# Patient Record
Sex: MALE | Race: BLACK OR AFRICAN AMERICAN | Employment: FULL TIME | URBAN - METROPOLITAN AREA
[De-identification: names, ages, dates, MRNs, and addresses within clinical notes are randomized per-mention and may not be internally consistent; named-entity substitution may affect disease eponyms.]

---

## 2023-01-03 ENCOUNTER — APPOINTMENT (OUTPATIENT)
Dept: MRI IMAGING | Age: 58
End: 2023-01-03
Attending: STUDENT IN AN ORGANIZED HEALTH CARE EDUCATION/TRAINING PROGRAM
Payer: COMMERCIAL

## 2023-01-03 ENCOUNTER — HOSPITAL ENCOUNTER (EMERGENCY)
Age: 58
Discharge: HOME | End: 2023-01-03
Attending: STUDENT IN AN ORGANIZED HEALTH CARE EDUCATION/TRAINING PROGRAM
Payer: COMMERCIAL

## 2023-01-03 VITALS
SYSTOLIC BLOOD PRESSURE: 135 MMHG | RESPIRATION RATE: 18 BRPM | TEMPERATURE: 97.5 F | DIASTOLIC BLOOD PRESSURE: 78 MMHG | HEART RATE: 66 BPM | OXYGEN SATURATION: 99 %

## 2023-01-03 DIAGNOSIS — R93.0 ABNORMAL HEAD CT: ICD-10-CM

## 2023-01-03 DIAGNOSIS — R42 DIZZINESS: Primary | ICD-10-CM

## 2023-01-03 LAB
ALBUMIN SERPL-MCNC: 3.9 G/DL (ref 3.5–5)
ALBUMIN/GLOB SERPL: 1.2 {RATIO} (ref 1.1–2.2)
ALP SERPL-CCNC: 73 U/L (ref 45–117)
ALT SERPL-CCNC: 20 U/L (ref 12–78)
ANION GAP SERPL CALC-SCNC: 1 MMOL/L (ref 5–15)
AST SERPL-CCNC: 15 U/L (ref 15–37)
ATRIAL RATE: 57 BPM
BASOPHILS # BLD: 0 K/UL (ref 0–0.1)
BASOPHILS NFR BLD: 0 % (ref 0–1)
BILIRUB SERPL-MCNC: 0.5 MG/DL (ref 0.2–1)
BUN SERPL-MCNC: 12 MG/DL (ref 6–20)
BUN/CREAT SERPL: 14 (ref 12–20)
CALCIUM SERPL-MCNC: 9.3 MG/DL (ref 8.5–10.1)
CALCULATED P AXIS, ECG09: 49 DEGREES
CALCULATED R AXIS, ECG10: 81 DEGREES
CALCULATED T AXIS, ECG11: 18 DEGREES
CHLORIDE SERPL-SCNC: 105 MMOL/L (ref 97–108)
CO2 SERPL-SCNC: 30 MMOL/L (ref 21–32)
COMMENT, HOLDF: NORMAL
CREAT SERPL-MCNC: 0.85 MG/DL (ref 0.7–1.3)
DIAGNOSIS, 93000: NORMAL
DIFFERENTIAL METHOD BLD: NORMAL
EOSINOPHIL # BLD: 0 K/UL (ref 0–0.4)
EOSINOPHIL NFR BLD: 1 % (ref 0–7)
ERYTHROCYTE [DISTWIDTH] IN BLOOD BY AUTOMATED COUNT: 12.6 % (ref 11.5–14.5)
GLOBULIN SER CALC-MCNC: 3.3 G/DL (ref 2–4)
GLUCOSE SERPL-MCNC: 103 MG/DL (ref 65–100)
HCT VFR BLD AUTO: 46.5 % (ref 36.6–50.3)
HGB BLD-MCNC: 15.4 G/DL (ref 12.1–17)
IMM GRANULOCYTES # BLD AUTO: 0 K/UL (ref 0–0.04)
IMM GRANULOCYTES NFR BLD AUTO: 0 % (ref 0–0.5)
LYMPHOCYTES # BLD: 1.2 K/UL (ref 0.8–3.5)
LYMPHOCYTES NFR BLD: 22 % (ref 12–49)
MCH RBC QN AUTO: 31 PG (ref 26–34)
MCHC RBC AUTO-ENTMCNC: 33.1 G/DL (ref 30–36.5)
MCV RBC AUTO: 93.6 FL (ref 80–99)
MONOCYTES # BLD: 0.5 K/UL (ref 0–1)
MONOCYTES NFR BLD: 9 % (ref 5–13)
NEUTS SEG # BLD: 3.7 K/UL (ref 1.8–8)
NEUTS SEG NFR BLD: 68 % (ref 32–75)
NRBC # BLD: 0 K/UL (ref 0–0.01)
NRBC BLD-RTO: 0 PER 100 WBC
P-R INTERVAL, ECG05: 150 MS
PLATELET # BLD AUTO: 273 K/UL (ref 150–400)
PMV BLD AUTO: 8.9 FL (ref 8.9–12.9)
POTASSIUM SERPL-SCNC: 4.4 MMOL/L (ref 3.5–5.1)
PROT SERPL-MCNC: 7.2 G/DL (ref 6.4–8.2)
Q-T INTERVAL, ECG07: 406 MS
QRS DURATION, ECG06: 90 MS
QTC CALCULATION (BEZET), ECG08: 395 MS
RBC # BLD AUTO: 4.97 M/UL (ref 4.1–5.7)
SAMPLES BEING HELD,HOLD: NORMAL
SODIUM SERPL-SCNC: 136 MMOL/L (ref 136–145)
VENTRICULAR RATE, ECG03: 57 BPM
WBC # BLD AUTO: 5.5 K/UL (ref 4.1–11.1)

## 2023-01-03 PROCEDURE — 74011250637 HC RX REV CODE- 250/637: Performed by: STUDENT IN AN ORGANIZED HEALTH CARE EDUCATION/TRAINING PROGRAM

## 2023-01-03 PROCEDURE — 70544 MR ANGIOGRAPHY HEAD W/O DYE: CPT

## 2023-01-03 PROCEDURE — 74011250636 HC RX REV CODE- 250/636: Performed by: STUDENT IN AN ORGANIZED HEALTH CARE EDUCATION/TRAINING PROGRAM

## 2023-01-03 PROCEDURE — 70551 MRI BRAIN STEM W/O DYE: CPT

## 2023-01-03 PROCEDURE — 80053 COMPREHEN METABOLIC PANEL: CPT

## 2023-01-03 PROCEDURE — 93005 ELECTROCARDIOGRAM TRACING: CPT

## 2023-01-03 PROCEDURE — 96374 THER/PROPH/DIAG INJ IV PUSH: CPT

## 2023-01-03 PROCEDURE — 85025 COMPLETE CBC W/AUTO DIFF WBC: CPT

## 2023-01-03 PROCEDURE — 99284 EMERGENCY DEPT VISIT MOD MDM: CPT

## 2023-01-03 PROCEDURE — 36415 COLL VENOUS BLD VENIPUNCTURE: CPT

## 2023-01-03 RX ORDER — GUAIFENESIN 100 MG/5ML
81 LIQUID (ML) ORAL DAILY
Qty: 30 TABLET | Refills: 0 | Status: SHIPPED | OUTPATIENT
Start: 2023-01-03

## 2023-01-03 RX ORDER — GUAIFENESIN 100 MG/5ML
81 LIQUID (ML) ORAL
Status: COMPLETED | OUTPATIENT
Start: 2023-01-03 | End: 2023-01-03

## 2023-01-03 RX ORDER — MECLIZINE HCL 12.5 MG 12.5 MG/1
12.5 TABLET ORAL
Status: COMPLETED | OUTPATIENT
Start: 2023-01-03 | End: 2023-01-03

## 2023-01-03 RX ORDER — ONDANSETRON 2 MG/ML
4 INJECTION INTRAMUSCULAR; INTRAVENOUS
Status: COMPLETED | OUTPATIENT
Start: 2023-01-03 | End: 2023-01-03

## 2023-01-03 RX ORDER — MECLIZINE HCL 12.5 MG 12.5 MG/1
12.5 TABLET ORAL
Qty: 12 TABLET | Refills: 0 | Status: SHIPPED | OUTPATIENT
Start: 2023-01-03

## 2023-01-03 RX ORDER — ONDANSETRON 4 MG/1
4 TABLET, ORALLY DISINTEGRATING ORAL
Qty: 12 TABLET | Refills: 0 | Status: SHIPPED | OUTPATIENT
Start: 2023-01-03

## 2023-01-03 RX ADMIN — ASPIRIN 81 MG: 81 TABLET, CHEWABLE ORAL at 15:28

## 2023-01-03 RX ADMIN — ONDANSETRON HYDROCHLORIDE 4 MG: 2 SOLUTION INTRAMUSCULAR; INTRAVENOUS at 10:26

## 2023-01-03 RX ADMIN — MECLIZINE 12.5 MG: 12.5 TABLET ORAL at 10:26

## 2023-01-03 NOTE — ED PROVIDER NOTES
Chief Complaint   Patient presents with    Dizziness     This is a 47-year-old male otherwise healthy presenting with dizziness, started 4 days ago without any precipitant. He does not feel that it is triggered by changes in head position, says it has been intermittent in nature, characterizes the dizziness as a sensation of dysequlibrium. Also feels lightheaded. Has never had symptoms of this nature in the past.  Denies associated headache, neck pain, loss of vision, speech deficit, lateralizing weakness or sensory changes. Reports a history of tobacco use, no history of stroke in the past.  Denies any past medical history. No associated ear pain or recent febrile illness. No other systemic complaints. Review of Systems   Constitutional:  Negative for fever. Eyes:  Negative for visual disturbance. Respiratory:  Negative for shortness of breath. Cardiovascular:  Negative for chest pain. Gastrointestinal:  Negative for abdominal pain and vomiting. Musculoskeletal:  Negative for neck pain. Neurological:  Positive for dizziness and light-headedness. Negative for syncope and headaches. No past medical history on file. No past surgical history on file. No family history on file.     Social History     Socioeconomic History    Marital status: SINGLE     Spouse name: Not on file    Number of children: Not on file    Years of education: Not on file    Highest education level: Not on file   Occupational History    Not on file   Tobacco Use    Smoking status: Not on file    Smokeless tobacco: Not on file   Substance and Sexual Activity    Alcohol use: Not on file    Drug use: Not on file    Sexual activity: Not on file   Other Topics Concern    Not on file   Social History Narrative    Not on file     Social Determinants of Health     Financial Resource Strain: Not on file   Food Insecurity: Not on file   Transportation Needs: Not on file   Physical Activity: Not on file   Stress: Not on file   Social Connections: Not on file   Intimate Partner Violence: Not on file   Housing Stability: Not on file         ALLERGIES: Patient has no known allergies. Vitals:    01/03/23 0721 01/03/23 1030 01/03/23 1140   BP: (!) 144/95 (!) 156/93 135/78   Pulse: 62 62 66   Resp: 18 18 18   Temp: 98.1 °F (36.7 °C) 98.2 °F (36.8 °C) 97.5 °F (36.4 °C)   SpO2: 99% 100% 99%       Physical exam  General:  Awake and alert, NAD  HEENT:  NC/AT, equal pupils, moist mucous membranes  Neck:   Normal inspection, full range of motion  Cardiac:  RRR, no murmurs  Respiratory:  Clear bilaterally, no wheezes, rales, rhonchi  Abdomen:  Soft and nontender, nondistended  Extremities: Warm and well perfused, no peripheral edema  Neuro:  CN grossly intact, moving all extremities symmetrically without gross motor deficit, ambulatory without gait ataxia  Skin:   No rashes, ecchymoses, or pallor      Recent Results (from the past 12 hour(s))   CBC WITH AUTOMATED DIFF    Collection Time: 01/03/23  7:43 AM   Result Value Ref Range    WBC 5.5 4.1 - 11.1 K/uL    RBC 4.97 4.10 - 5.70 M/uL    HGB 15.4 12.1 - 17.0 g/dL    HCT 46.5 36.6 - 50.3 %    MCV 93.6 80.0 - 99.0 FL    MCH 31.0 26.0 - 34.0 PG    MCHC 33.1 30.0 - 36.5 g/dL    RDW 12.6 11.5 - 14.5 %    PLATELET 946 297 - 818 K/uL    MPV 8.9 8.9 - 12.9 FL    NRBC 0.0 0  WBC    ABSOLUTE NRBC 0.00 0.00 - 0.01 K/uL    NEUTROPHILS 68 32 - 75 %    LYMPHOCYTES 22 12 - 49 %    MONOCYTES 9 5 - 13 %    EOSINOPHILS 1 0 - 7 %    BASOPHILS 0 0 - 1 %    IMMATURE GRANULOCYTES 0 0.0 - 0.5 %    ABS. NEUTROPHILS 3.7 1.8 - 8.0 K/UL    ABS. LYMPHOCYTES 1.2 0.8 - 3.5 K/UL    ABS. MONOCYTES 0.5 0.0 - 1.0 K/UL    ABS. EOSINOPHILS 0.0 0.0 - 0.4 K/UL    ABS. BASOPHILS 0.0 0.0 - 0.1 K/UL    ABS. IMM.  GRANS. 0.0 0.00 - 0.04 K/UL    DF AUTOMATED     METABOLIC PANEL, COMPREHENSIVE    Collection Time: 01/03/23  7:43 AM   Result Value Ref Range    Sodium 136 136 - 145 mmol/L    Potassium 4.4 3.5 - 5.1 mmol/L Chloride 105 97 - 108 mmol/L    CO2 30 21 - 32 mmol/L    Anion gap 1 (L) 5 - 15 mmol/L    Glucose 103 (H) 65 - 100 mg/dL    BUN 12 6 - 20 MG/DL    Creatinine 0.85 0.70 - 1.30 MG/DL    BUN/Creatinine ratio 14 12 - 20      eGFR >60 >60 ml/min/1.73m2    Calcium 9.3 8.5 - 10.1 MG/DL    Bilirubin, total 0.5 0.2 - 1.0 MG/DL    ALT (SGPT) 20 12 - 78 U/L    AST (SGOT) 15 15 - 37 U/L    Alk. phosphatase 73 45 - 117 U/L    Protein, total 7.2 6.4 - 8.2 g/dL    Albumin 3.9 3.5 - 5.0 g/dL    Globulin 3.3 2.0 - 4.0 g/dL    A-G Ratio 1.2 1.1 - 2.2     SAMPLES BEING HELD    Collection Time: 01/03/23  7:43 AM   Result Value Ref Range    SAMPLES BEING HELD 1BLU 1RED     COMMENT        Add-on orders for these samples will be processed based on acceptable specimen integrity and analyte stability, which may vary by analyte. EKG, 12 LEAD, INITIAL    Collection Time: 01/03/23 10:13 AM   Result Value Ref Range    Ventricular Rate 57 BPM    Atrial Rate 57 BPM    P-R Interval 150 ms    QRS Duration 90 ms    Q-T Interval 406 ms    QTC Calculation (Bezet) 395 ms    Calculated P Axis 49 degrees    Calculated R Axis 81 degrees    Calculated T Axis 18 degrees    Diagnosis       Sinus bradycardia  Minimal voltage criteria for LVH, may be normal variant ( Sokolow-Barnhart )  Borderline ECG  No previous ECGs available        MRA BRAIN WO CONT    Result Date: 1/3/2023  1. No evidence of significant stenosis or aneurysm. MRI BRAIN WO CONT    Result Date: 1/3/2023  1. No acute intracranial abnormality. 2. Small chronic infarct in the right thalamus. Procedures - none unless documented below    EKG as interpreted by me:  Normal sinus rhythm at a rate of 57, normal axis and intervals, grossly no ST or T wave changes suggesting acute ischemia. ED course: Labs, EKG and imaging reviewed.   Neurologic exam is nonfocal.  MRI/MRA brain unremarkable for acute infarct, dissection/aneurysm, however there is evidence of a small chronic appearing infarct in the right thalamus. Doubt this is related to his presentation today, which is more likely secondary to peripheral vertigo. He feels better after receiving meclizine and Zofran. I discussed his case with neurology Ree Royer) who advised having him continue aspirin 81 mg and follow up at the TIA clinic. I counseled him at length regarding need for smoking cessation. Will also have him continue meclizine symptomatically in the meantime and follow up with ENT in the outpatient setting, although the priority is having him see outpatient neurology as quickly as possible. Will discharge home with strict return precautions.     Impression: Acute dizziness, abnormal brain MRI  Disposition: Discharge home

## 2023-01-03 NOTE — Clinical Note
Morenita Russell was seen and treated in our emergency department on 1/3/2023. Please excuse Konstantin Winston from work today 1/3/23 through 1/5/23 for medical reasons. He should return to work only if his symptoms have resolved.     Judge Chalo MD

## 2023-01-03 NOTE — DISCHARGE INSTRUCTIONS
- Your brain MRI showed evidence of a prior thalamic stroke. I discussed your case with the on call neurologist who recommended continuing aspirin 81 mg daily, smoking cessation, and follow up at the TIA clinic at [facility] within 72 hours to be reevaluated  - Use meclizine as needed for dizziness, Zofran for nausea. Can attempt the Epley maneuever like we talked about for acute attacks of vertigo.   - Return for severe headache, worsening dizziness, loss of vision, passing out, new speech changes, weakness/numbness, or difficulty walking

## 2023-01-03 NOTE — ED TRIAGE NOTES
Pt arrives ambulatory from home for dizziness for the past 3 days. He states he has been having many intermittent periods of feeling like his equilibrium is off. A&OX4.

## 2023-01-03 NOTE — CONSULTS
Brief Neurology Note:    Neurology consult placed for chronic infarct on MRI. Not a typical reason for inpatient consultation. Recommend starting patient on aspirin, checking lipid panel with goal cholesterol of < 200, LDL < 70 and hemoglobin A1c. MRA brain is unrevealing. If happens to smoke would recommend smoking cessation. For dizziness, would recommend orthostatics, additional medical work-up as deemed necessary to evaluate dizziness, may need outpatient ENT evaluation.     As completing this documentation seen that patient has been discharged

## 2023-01-03 NOTE — ED NOTES
The MD has reviewed discharge instructions with the patient. The patient verbalized understanding. The patient left the Emergency Department ambulatory, alert and oriented and in no acute distress. The patient was encouraged to call or return to the ED for worsening issues or problems and was encouraged to schedule a follow up appointment for continuing care.      The patient verbalized understanding of discharge instructions and prescriptions, all questions were answered. The patient has no further concerns at this time.

## 2023-01-17 ENCOUNTER — OFFICE VISIT (OUTPATIENT)
Dept: NEUROLOGY | Age: 58
End: 2023-01-17
Payer: COMMERCIAL

## 2023-01-17 VITALS
RESPIRATION RATE: 17 BRPM | WEIGHT: 179 LBS | HEART RATE: 65 BPM | SYSTOLIC BLOOD PRESSURE: 125 MMHG | OXYGEN SATURATION: 98 % | BODY MASS INDEX: 26.51 KG/M2 | HEIGHT: 69 IN | DIASTOLIC BLOOD PRESSURE: 73 MMHG

## 2023-01-17 DIAGNOSIS — Z86.73 CEREBRAL INFARCTION, CHRONIC: ICD-10-CM

## 2023-01-17 DIAGNOSIS — R42 DIZZINESS: Primary | ICD-10-CM

## 2023-01-17 DIAGNOSIS — G45.9 TIA (TRANSIENT ISCHEMIC ATTACK): ICD-10-CM

## 2023-01-17 PROCEDURE — 99215 OFFICE O/P EST HI 40 MIN: CPT

## 2023-01-17 NOTE — PROGRESS NOTES
Chief Complaint   Patient presents with    TIA    Hospital Follow Up     TIA.   Patient reports balance issues    Visit Vitals  /73 (BP 1 Location: Left arm, BP Patient Position: Sitting, BP Cuff Size: Adult)   Pulse 65   Resp 17   Ht 5' 9\" (1.753 m)   Wt 179 lb (81.2 kg)   SpO2 98%   BMI 26.43 kg/m²

## 2023-01-17 NOTE — PROGRESS NOTES
Chief Complaint   Patient presents with    TIA    Hospital Follow Up     TIA. Patient reports balance issues       HPI    Mr Cristhian Martin is a 59year old gentlemen here for a hospital follow up. He was seen in the hospital as a new patient on 1/3/23 by Dr Dacia Oliva for dizziness and abnormal head CT. His MRI showed a chronic infarct in the right thalamus, but nothing acute. MRA was checked and was also negative for stenosis or aneurysm. He was started on ASA 81 mg. It looks like they did not check a lipid panel or A1c in the hospital. He was discharged home in stable condition. This was a TIA vs. vertigo presentation. He is doing well since discharge. He has not smoked since then. No other stroke s/s reported. He is still getting dizzy. Its only when he wakes up in the morning. Describes as Elena Kick eyes\" no room spinning. He sits on the edge of the bed and it goes away. No N&V, no falls. He does not have a PCP to follow up with. Review of Systems   Eyes:  Negative for blurred vision and double vision. Neurological:  Positive for dizziness. Negative for weakness. History reviewed. No pertinent past medical history. History reviewed. No pertinent family history.   Social History     Socioeconomic History    Marital status: SINGLE     Spouse name: Not on file    Number of children: Not on file    Years of education: Not on file    Highest education level: Not on file   Occupational History    Not on file   Tobacco Use    Smoking status: Never    Smokeless tobacco: Not on file   Substance and Sexual Activity    Alcohol use: Not Currently    Drug use: Never    Sexual activity: Not on file   Other Topics Concern    Not on file   Social History Narrative    Not on file     Social Determinants of Health     Financial Resource Strain: Not on file   Food Insecurity: Not on file   Transportation Needs: Not on file   Physical Activity: Not on file   Stress: Not on file   Social Connections: Not on file   Intimate Partner Violence: Not on file   Housing Stability: Not on file     No Known Allergies      Current Outpatient Medications   Medication Sig    meclizine (ANTIVERT) 12.5 mg tablet Take 1 Tablet by mouth three (3) times daily as needed for Dizziness. ondansetron (ZOFRAN ODT) 4 mg disintegrating tablet Take 1 Tablet by mouth every six (6) hours as needed for Nausea, Vomiting or Nausea or Vomiting. aspirin 81 mg chewable tablet Take 1 Tablet by mouth daily. No current facility-administered medications for this visit. Neurologic Exam     Mental Status   Oriented to person, place, and time. Attention: normal. Concentration: normal.   Speech: speech is normal   Level of consciousness: alert    Cranial Nerves   Cranial nerves II through XII intact. Sensory Exam   Light touch normal.     Gait, Coordination, and Reflexes     Gait  Gait: normal    Visit Vitals  /73 (BP 1 Location: Left arm, BP Patient Position: Sitting, BP Cuff Size: Adult)   Pulse 65   Resp 17   Ht 5' 9\" (1.753 m)   Wt 179 lb (81.2 kg)   SpO2 98%   BMI 26.43 kg/m²         CT Results (maximum last 3): No results found for this or any previous visit. MRI Results (maximum last 3): Results from [place] encounter on 01/03/23    MRA BRAIN WO CONT    Narrative  EXAM: MRA BRAIN WO CONT    INDICATION:   dizziness, rule out stroke    COMPARISON:  None    CONTRAST:  None. TECHNIQUE:  3-D time-of-flight noncontrast MRA of the brain was performed. Multiplanar and  MIP reconstructions were obtained. FINDINGS:  There is no evidence of large vessel occlusion or flow-limiting stenosis of the  intracranial internal carotid, anterior cerebral, and middle cerebral arteries. The anterior communicating artery is diminutive but patent. There is no evidence of large vessel occlusion or flow-limiting stenosis of the  intracranial vertebral arteries, basilar artery, or posterior cerebral arteries.   The posterior communicating arteries are patent. There is no evidence of aneurysm or vascular malformation. Impression  1. No evidence of significant stenosis or aneurysm. MRI BRAIN WO CONT    Narrative  EXAM: MRI BRAIN WO CONT    INDICATION: dizziness, rule out stroke    COMPARISON: None. CONTRAST: None. TECHNIQUE:  Multiplanar multisequence acquisition without contrast of the brain. FINDINGS:  The ventricles are normal in size and position. Small chronic infarct in the  right thalamus. There is no acute infarct, hemorrhage, extra-axial fluid  collection, or mass effect. There is no cerebellar tonsillar herniation. Expected arterial flow-voids are present. Mild mucosal thickening in the bilateral maxillary sinuses and right posterior  ethmoidal air cells. The mastoid air cells and middle ears are clear. The  orbital contents are within normal limits. No significant osseous or scalp  lesions are identified. Impression  1. No acute intracranial abnormality. 2. Small chronic infarct in the right thalamus. Follow-up and Dispositions    Return if symptoms worsen or fail to improve. Assessment and Plan   Diagnoses and all orders for this visit:    1. Dizziness  -     REFERRAL TO ENT-OTOLARYNGOLOGY    2. TIA (transient ischemic attack)    3. Cerebral infarction, chronic    59year old male with history of chronic CVA and dizziness. He has had no new stroke s/s since his discharge. Continue on the ASA. I recommended some PCP for him to call and stressed the importance to get established with one so his health and labs can be followed. We talked about risk factors for stroke and ways to prevent it. He has stopped smoking which is a huge step for him. His dizziness does not sound neurological, nor are there any symptoms on his exam that worry me. I will refer him to ENT for a work up and some possible therapy. He does not need to follow up with us however we are here if needed.        I spent 45 minutes of time today reviewing the medical record and notes, imaging, examining the patient, and face-to-face time, patient/family teaching and medication side effects, and time spent completing documentation.       Jaelyn Doty, APRN